# Patient Record
Sex: MALE | Race: WHITE | URBAN - METROPOLITAN AREA
[De-identification: names, ages, dates, MRNs, and addresses within clinical notes are randomized per-mention and may not be internally consistent; named-entity substitution may affect disease eponyms.]

---

## 2017-01-06 ENCOUNTER — OFFICE VISIT (OUTPATIENT)
Dept: FAMILY MEDICINE | Facility: CLINIC | Age: 62
End: 2017-01-06
Payer: COMMERCIAL

## 2017-01-06 VITALS
DIASTOLIC BLOOD PRESSURE: 74 MMHG | RESPIRATION RATE: 16 BRPM | SYSTOLIC BLOOD PRESSURE: 130 MMHG | HEIGHT: 69 IN | WEIGHT: 189 LBS | BODY MASS INDEX: 27.99 KG/M2 | HEART RATE: 68 BPM | TEMPERATURE: 98.1 F

## 2017-01-06 DIAGNOSIS — F41.1 GENERALIZED ANXIETY DISORDER: Primary | ICD-10-CM

## 2017-01-06 DIAGNOSIS — R45.851 SUICIDAL IDEATION: ICD-10-CM

## 2017-01-06 DIAGNOSIS — F32.0 MAJOR DEPRESSIVE DISORDER, SINGLE EPISODE, MILD (H): ICD-10-CM

## 2017-01-06 DIAGNOSIS — F51.04 PSYCHOPHYSIOLOGICAL INSOMNIA: ICD-10-CM

## 2017-01-06 DIAGNOSIS — J06.9 VIRAL URI WITH COUGH: ICD-10-CM

## 2017-01-06 DIAGNOSIS — E03.9 ACQUIRED HYPOTHYROIDISM: ICD-10-CM

## 2017-01-06 LAB — HGB BLD-MCNC: 14.7 G/DL (ref 13.3–17.7)

## 2017-01-06 PROCEDURE — 85018 HEMOGLOBIN: CPT | Performed by: PHYSICIAN ASSISTANT

## 2017-01-06 PROCEDURE — 36415 COLL VENOUS BLD VENIPUNCTURE: CPT | Performed by: PHYSICIAN ASSISTANT

## 2017-01-06 PROCEDURE — 99000 SPECIMEN HANDLING OFFICE-LAB: CPT | Performed by: PHYSICIAN ASSISTANT

## 2017-01-06 PROCEDURE — 84443 ASSAY THYROID STIM HORMONE: CPT | Performed by: PHYSICIAN ASSISTANT

## 2017-01-06 PROCEDURE — 82306 VITAMIN D 25 HYDROXY: CPT | Mod: 90 | Performed by: PHYSICIAN ASSISTANT

## 2017-01-06 PROCEDURE — 99215 OFFICE O/P EST HI 40 MIN: CPT | Performed by: PHYSICIAN ASSISTANT

## 2017-01-06 RX ORDER — ESCITALOPRAM OXALATE 20 MG/1
TABLET ORAL
Qty: 30 TABLET | Refills: 0 | Status: SHIPPED | OUTPATIENT
Start: 2017-01-06 | End: 2017-02-09

## 2017-01-06 RX ORDER — MULTIPLE VITAMINS W/ MINERALS TAB 9MG-400MCG
1 TAB ORAL DAILY
COMMUNITY

## 2017-01-06 NOTE — NURSING NOTE
"  SUBJECTIVE:                                                    Adeel Long is a 61 year old male who presents to clinic today for the following health issues:      Abnormal Mood Symptoms     Onset: ??? On and off episodes - episodes at work this week     Description:   Depression: YES- not sure  Anxiety: YES    Accompanying Signs & Symptoms:  Still participating in activities that you used to enjoy: YES  Fatigue: YES  Irritability: YES  Difficulty concentrating: YES-   Changes in appetite: no  Problems with sleep: YES- can't fall asleep  Heart racing/beating fast : YES- this week  Thoughts of hurting yourself or others: thoughts it's NOT worth it     History:   Recent stress: YES- work issues/stress  Prior depression hospitalization: None  Family history of depression: YES- sister   Family history of anxiety: no      Precipitating factors:   Alcohol/drug use: no    Alleviating factors:  none       Therapies Tried and outcome: None      {additional problems for provider to add:750836}    Problem list and histories reviewed & adjusted, as indicated.  Additional history: {NONE - AS DOCUMENTED:169738::\"as documented\"}    {HIST REVIEW/ LINKS 2:228855}    {PROVIDER CHARTING PREFERENCE:928910}      "

## 2017-01-06 NOTE — Clinical Note
[address] [place] [address]   [phone]      January 10, 2017    Adeel Long  [address] [facility]  [place] [address]                Suhas Denis,    I have reviewed your recent labs. Here are the results:    -Thyroid testing is OK - continue same dose.   -Hemoglobin is normal.   -Vitamin D is OK - take 2,000 IU vitamin D3 over the counter daily.     If you have any questions please do not hesitate to contact our office via phone ([phone]) or First Opinion by clicking the contact my Care Team link.    If you have further questions about the interpretation of your lab results, [email or web address] is a great website to check out.    Thank you for allowing me to participate in your care!    CALLY Cleveland, PAIgorC  [facility]

## 2017-01-06 NOTE — PATIENT INSTRUCTIONS
1. Melatonin nightly - 9 mg max.   2. Lexapro - new medication.   3. Schedule counseling.   4. Follow up in a couple of weeks for recheck.       [facility] Mobile Crisis Services    [place] (Adult Only):  [phone]    [place]:   [phone]    [place]:   [phone]    [place]: Child:  [phone]        Adult: [phone]    [place]: Child:  [phone]                  Adult:  [phone]    [place]:  [phone]      Crisis Connection:   [phone]  (24 hour Crisis Phone Line)    Acute Psychiatric  Phone: [phone]   Services  Suicide Hotline: 802.307.1444     [address], [place] [address]    (Walk-in and telephone crisis intervention services focusing on adults)    Crisis Intervention: [phone] or 322-920-2646 (TTY: [phone]). Call anytime for help.   [employer] (www.[place].zoraida.org): 827.189.7541 or 794-957-7840.      Treating Anxiety Disorders with Medication  An anxiety disorder can make you feel nervous or apprehensive, even without a clear reason. Certain anxiety disorders can cause intense feelings of fear or panic. You may even have physical symptoms, such as a racing heartbeat or dizziness. If you have these feelings, you don t have to suffer anymore. Treatment to help you overcome your fears will likely include therapy (also called counseling). Medication may also be prescribed to help control your symptoms.    Medications  Certain medications may be prescribed to help control your symptoms. As a result, you may feel less anxious. You may also feel able to move forward with therapy. At first, medications and dosages may need to be adjusted to find what works best for you. Try to be patient. Tell your health care provider how a medication makes you feel. This way, you can work together to find the treatment that s best for you. Keep in mind that medications can have side effects. Talk to your provider about any side effects that are bothering you.  Changing the dose or type of medication may help. Don t stop taking medication on your own because it can cause symptoms to come back.    Anti-anxiety medication: This medication eases symptoms and helps you relax. Your health care provider will explain when and how to use it. It may be prescribed for use before situations that makes you anxious. Or, you may be told to take it on a regular schedule. Anti-anxiety medication may make you feel a little sleepy or  out of it.  Don t drive a car or operate machinery while on this medication, until you know how it affects you.  Caution  Never use alcohol or other drugs with anti-anxiety medications. This could result in loss of muscular control, sedation, coma or death. Also, use only the amount of medication prescribed for you. If you think you may have taken too much, get emergency care right away.     Antidepressant medication: This kind of medication is often used to treat anxiety, even if you aren t depressed. An antidepressant helps balance out brain chemicals. This helps keep anxiety under control. This medication is taken on a schedule. It takes a few weeks to start working. If you don t notice a change at first, you may just need more time. But if you don t notice results after the first few weeks, tell your provider.  Keep taking medications as prescribed  Never change your dosage or stop taking your medications without talking to your health care provider first. Keep the following in mind:    Some medications must be taken on a schedule. Make this part of your daily routine. For instance, always take your pill before brushing your teeth. A pillbox can help you remember if you ve taken your medication each day.    Medications are often taken for 6 to 12 months. Your health care provider will then evaluate whether you need to stay on them. Many people who have also had therapy may no longer need medication to manage anxiety.    You may need to stop taking  medication slowly to give your body time to adjust. When it s time to stop, your health care provider will tell you more. Remember: Never stop taking your medication without talking to your provider first.    If symptoms return, you may need to start taking medications again. This isn t your fault. It s just the nature of your anxiety disorder.  Special concerns    Side effects: Medications may cause side effects. Ask your health care provider or pharmacist what you can expect. They may have ideas for avoiding some side effects.    Sexual problems: Some antidepressants can affect your desire for sex or your ability to have an orgasm. A change in dosage or medication often solves the problem. If you have a sexual side effect that concerns you, tell your health care provider.    Addiction: Antidepressants are not addictive. And if you ve never had a problem with drugs or alcohol, you likely won t have a problem with anti-anxiety medication. But if you have history of addiction, you may need to avoid this medication.     1526-4012 [employer]. [address] [place] [address], [place] [address]. All rights reserved. This information is not intended as a substitute for professional medical care. Always follow your healthcare professional's instructions.

## 2017-01-06 NOTE — PROGRESS NOTES
SUBJECTIVE:                                                    Adeel Long is a 61 year old male who presents to clinic today for the following health issues:      Abnormal Mood Symptoms     Onset: ??? On and off episodes - episodes at work this week     Description:   Depression: YES- not sure  Anxiety: YES    Accompanying Signs & Symptoms:  Still participating in activities that you used to enjoy: YES  Fatigue: YES  Irritability: YES  Difficulty concentrating: YES-   Changes in appetite: no  Problems with sleep: YES- can't fall asleep  Heart racing/beating fast : YES- this week  Thoughts of hurting yourself or others: thoughts it's NOT worth it     History:   Recent stress: YES- work issues/stress  Prior depression hospitalization: None  Family history of depression: YES- sister   Family history of anxiety: no      Precipitating factors:   Alcohol/drug use: no    Alleviating factors:  none       Therapies Tried and outcome: None    Patient has been feeling this way for about the past year. Struggles with work and assignments. Was working in [place], family here though. Overwhelmed and frustrated. Also frustrated with things at home - the clutter really bothers him.  He endorses fleeting suicidal thoughts because when he has been very overwhelmed, he wonders if it's worth it to live. He would never act on his thoughts and has never made a plan. He has good support from dtr and wife. Dtr taking zoloft and they had a heart-to-heart about this recently, which helped him come in to talk about it. Wife accompanies patient, but he asked her to step out.       #2- URI - few days, getting better. Runny nose, mild cough. No fevers or rash.     Unsure when last had his thyroid checked.   -------------------------------------    Problem list and histories reviewed & adjusted, as indicated.  Additional history: as documented    Patient Active Problem List   Diagnosis     Sun-damaged skin     Acquired hypothyroidism      Major depressive disorder, single episode, mild (H)     Generalized anxiety disorder     Suicidal ideation     Psychophysiological insomnia     Past Surgical History   Procedure Laterality Date     Manning teeth       Esophagus surgery       zenker's diverticulum tx x 2     Laparoscopic herniorrhaphy inguinal bilateral Right 6/5/2015     Procedure: LAPAROSCOPIC HERNIORRHAPHY INGUINAL BILATERAL;  Surgeon: Donnell Isaca MD;  Location: [facility]     Laparoscopic herniorrhaphy umbilical N/A 6/5/2015     Procedure: LAPAROSCOPIC HERNIORRHAPHY UMBILICAL;  Surgeon: Donnell Isaac MD;  Location: [facility]       Social History   Substance Use Topics     Smoking status: Light Tobacco Smoker     Types: Cigars     Smokeless tobacco: Not on file     Alcohol Use: 0.0 oz/week     0 Standard drinks or equivalent per week      Comment: 8 per week     Family History   Problem Relation Age of Onset     Colon Cancer Father      Hypertension Mother      Coronary Artery Disease Mother      stroke     Dementia Mother          Current Outpatient Prescriptions   Medication Sig Dispense Refill     multivitamin, therapeutic with minerals (MULTI-VITAMIN) TABS tablet Take 1 tablet by mouth daily       escitalopram (LEXAPRO) 20 MG tablet Take 1/2 tablet (10 mg) for 5 days, then increase to 1 tablet orally daily 30 tablet 0     levothyroxine (SYNTHROID,LEVOTHROID) 100 MCG tablet Take 100 mcg by mouth daily        No Known Allergies  Labs reviewed in EPIC  Problem list, Medication list, Allergies, and Medical/Social/Surgical histories reviewed in [facility] and updated as appropriate.    Social History     Social History     Marital Status:      Spouse Name:       Number of Children: 1     Years of Education: N/A     Occupational History     [employer]      Social History Main Topics     Smoking status: Light Tobacco Smoker     Types: Cigars     Smokeless tobacco: None     Alcohol Use: 0.0 oz/week     0 Standard drinks or equivalent  "per week      Comment: 8 per week     Drug Use: No     Sexual Activity: Yes     Other Topics Concern     None     Social History Narrative       10 point review of systems negative other than symptoms noted above.   Constitutional, HEENT, CV, pulmonary, GI, , MS, Endo, Psych systems are all negative, except as otherwise noted.       OBJECTIVE:  /74 mmHg  Pulse 68  Temp(Src) 98.1  F (36.7  C)  Resp 16  Ht 5' 9\" (1.753 m)  Wt 189 lb (85.73 kg)  BMI 27.90 kg/m2  CONSTITUTIONAL: Alert, well-nourished, well-groomed, NAD  RESP: Lungs CTA. No wheeze, rhonchi, rales. Normal effort on room air. Equal lung sounds bilaterally.   CV: HRRR, normal S1, S2. No MRG. No peripheral edema.  DERM: No rashes or suspicious lesions  PSYCH: Alert and oriented. Thought process is clear and goal-directed. Adequate insight and judgement. Mood - normal. Affect - normal. Full emotional range.   Head: Normocephalic, atraumatic.  Eyes: Conjunctiva clear, non icteric. PERRLA.  Ears: External ears and TMs normal BL.  Nose: Septum midline, nasal mucosa pink with clear drainage.   Mouth / Throat: Normal dentition.  No oral lesions. Pharynx non erythematous, tonsils without hypertrophy.  Neck: Supple, no enlarged LN, trachea midline.      Diagnostic Tests:  PHQ: 5-7  CHRISTINE: 7    ASSESSMENT/PLAN:  (F41.1) Generalized anxiety disorder  (primary encounter diagnosis)  Comment:   Plan: escitalopram (LEXAPRO) 20 MG tablet, MENTAL         HEALTH REFERRAL            (E03.9) Acquired hypothyroidism  Comment:   Plan: TSH with free T4 reflex            (F32.0) Major depressive disorder, single episode, mild (H)  Comment:   Plan: Vitamin D Deficiency, Hemoglobin, TSH with free        T4 reflex, escitalopram (LEXAPRO) 20 MG tablet,        MENTAL HEALTH REFERRAL            (J06.9,  B97.89) Viral URI with cough  Comment:   Plan: - Fluids, rest, good hand hygiene, over the counter analgesics (tylenol, ibuprofen) for fevers and/or pain, return if not " improving.      (F51.04) Psychophysiological insomnia  Comment:   Plan:     (Q45.498) Suicidal ideation  Comment:   Plan:     We discussed options of medication and / or counseling. He opts to do both. We will start him on Lexapro. He has been using melatonin for sleep, which helps, so we reviewed how to use this. We reviewed time expectation with Lexapro and possible side effects. He contracts for safety and we filled out a safety contract plan together - will scan into chart. He was also provided with crisis phone #s. I will see him back in 3-4 weeks for medication check, sooner PRN.     He does have a PCP at another clinic and is unsure if he will be transferring all care to here.       FOLLOW-UP: Routinely and sooner as needed.  The patient agrees with this assessment and plan and agrees to call or return to the clinic with any questions or concerns or if their condition worsens.  47 minutes. More than half of this time was spent in counseling and coordination of care      CALLY Witt, PA-C  [facility]

## 2017-01-06 NOTE — MR AVS SNAPSHOT
After Visit Summary   1/6/2017    Adeel Long    MRN: [id number]           Patient Information     Date Of Birth          1955        Visit Information        Provider Department      1/6/2017 2:20 PM Nat Kerns PA-C [facility]        Today's Diagnoses     Acquired hypothyroidism    -  1     Generalized anxiety disorder         Major depressive disorder, single episode, mild (H)           Care Instructions    1. Melatonin nightly - 9 mg max.   2. Lexapro - new medication.   3. Schedule counseling.   4. Follow up in a couple of weeks for recheck.       [facility] Mobile Crisis Services    Tuyet (Adult Only):  [phone]    Dasia:   [phone]    Augie:   [phone]    Jose Luis: Child:  [phone]        Adult: [phone]    Osvaldo: Child:  [phone]                  Adult:  [phone]    [place]:  [phone]      Crisis Connection:   [phone]  (24 hour Crisis Phone Line)    Acute Psychiatric  Phone: [phone]   Services  Suicide Hotline: 963.786.8004     [facility] [address], [place] [address]    (Walk-in and telephone crisis intervention services focusing on adults)    Crisis Intervention: 305.985.2300 or 912-834-8996 (TTY: 403.735.7140). Call anytime for help.   National [place] on Mental Illness (www.[place].zoraida.org): 767.777.2263 or 155-834-2800.      Treating Anxiety Disorders with Medication  An anxiety disorder can make you feel nervous or apprehensive, even without a clear reason. Certain anxiety disorders can cause intense feelings of fear or panic. You may even have physical symptoms, such as a racing heartbeat or dizziness. If you have these feelings, you don t have to suffer anymore. Treatment to help you overcome your fears will likely include therapy (also called counseling). Medication may also be prescribed to help control your symptoms.    Medications  Certain medications may be prescribed to help control your  symptoms. As a result, you may feel less anxious. You may also feel able to move forward with therapy. At first, medications and dosages may need to be adjusted to find what works best for you. Try to be patient. Tell your health care provider how a medication makes you feel. This way, you can work together to find the treatment that s best for you. Keep in mind that medications can have side effects. Talk to your provider about any side effects that are bothering you. Changing the dose or type of medication may help. Don t stop taking medication on your own because it can cause symptoms to come back.    Anti-anxiety medication: This medication eases symptoms and helps you relax. Your health care provider will explain when and how to use it. It may be prescribed for use before situations that makes you anxious. Or, you may be told to take it on a regular schedule. Anti-anxiety medication may make you feel a little sleepy or  out of it.  Don t drive a car or operate machinery while on this medication, until you know how it affects you.  Caution  Never use alcohol or other drugs with anti-anxiety medications. This could result in loss of muscular control, sedation, coma or death. Also, use only the amount of medication prescribed for you. If you think you may have taken too much, get emergency care right away.     Antidepressant medication: This kind of medication is often used to treat anxiety, even if you aren t depressed. An antidepressant helps balance out brain chemicals. This helps keep anxiety under control. This medication is taken on a schedule. It takes a few weeks to start working. If you don t notice a change at first, you may just need more time. But if you don t notice results after the first few weeks, tell your provider.  Keep taking medications as prescribed  Never change your dosage or stop taking your medications without talking to your health care provider first. Keep the following in mind:    Some  medications must be taken on a schedule. Make this part of your daily routine. For instance, always take your pill before brushing your teeth. A pillbox can help you remember if you ve taken your medication each day.    Medications are often taken for 6 to 12 months. Your health care provider will then evaluate whether you need to stay on them. Many people who have also had therapy may no longer need medication to manage anxiety.    You may need to stop taking medication slowly to give your body time to adjust. When it s time to stop, your health care provider will tell you more. Remember: Never stop taking your medication without talking to your provider first.    If symptoms return, you may need to start taking medications again. This isn t your fault. It s just the nature of your anxiety disorder.  Special concerns    Side effects: Medications may cause side effects. Ask your health care provider or pharmacist what you can expect. They may have ideas for avoiding some side effects.    Sexual problems: Some antidepressants can affect your desire for sex or your ability to have an orgasm. A change in dosage or medication often solves the problem. If you have a sexual side effect that concerns you, tell your health care provider.    Addiction: Antidepressants are not addictive. And if you ve never had a problem with drugs or alcohol, you likely won t have a problem with anti-anxiety medication. But if you have history of addiction, you may need to avoid this medication.     9662-6182 [employer] acquired by [employer]. [address] [place] [address]. All rights reserved. This information is not intended as a substitute for professional medical care. Always follow your healthcare professional's instructions.              Follow-ups after your visit        Additional Services     MENTAL HEALTH REFERRAL       Your provider has referred you to: [facility]: [facility] - Counseling (Individual/Couples/Family)  "- [facility] Igor Gonzalesen [place] [phone]   [email or web address]   *Patient will be contacted by [facility]'s scheduling partner, [facility] ([facility]), to schedule an appointment.  Patients may also call P to schedule.    All scheduling is subject to the client's specific insurance plan & benefits, provider/location availability, and provider clinical specialities.  Please arrive 15 minutes early for your first appointment and bring your completed paperwork.    Please be aware that coverage of these services is subject to the terms and limitations of your health insurance plan.  Call member services at your health plan with any benefit or coverage questions.                  Who to contact     If you have questions or need follow up information about today's clinic visit or your schedule please contact [facility] [place] directly at [phone].  Normal or non-critical lab and imaging results will be communicated to you by Prescienthart, letter or phone within 4 business days after the clinic has received the results. If you do not hear from us within 7 days, please contact the clinic through Prescienthart or phone. If you have a critical or abnormal lab result, we will notify you by phone as soon as possible.  Submit refill requests through Ingogo or call your pharmacy and they will forward the refill request to us. Please allow 3 business days for your refill to be completed.          Additional Information About Your Visit        Ingogo Information     Ingogo lets you send messages to your doctor, view your test results, renew your prescriptions, schedule appointments and more. To sign up, go to [email or web address] . Click on \"Log in\" on the left side of the screen, which will take you to the Welcome page. Then click on \"Sign up Now\" on the right side of the page.     You will be asked to enter the access code listed below, as well " "as some personal information. Please follow the directions to create your username and password.     Your access code is: [email or web address]  Expires: 2017  2:59 PM     Your access code will  in 90 days. If you need help or a new code, please call your [place] clinic or [phone].        Care EveryWhere ID     This is your Care EveryWhere ID. This could be used by other organizations to access your [place] medical records  [id number]        Your Vitals Were     Pulse Temperature Respirations Height BMI (Body Mass Index)       68 98.1  F (36.7  C) 16 5' 9\" (1.753 m) 27.90 kg/m2        Blood Pressure from Last 3 Encounters:   17 144/70   06/05/15 126/78    Weight from Last 3 Encounters:   17 189 lb (85.73 kg)   06/05/15 199 lb 6 oz (90.436 kg)              We Performed the Following     Hemoglobin     MENTAL HEALTH REFERRAL     TSH with free T4 reflex     Vitamin D Deficiency          Today's Medication Changes          These changes are accurate as of: 17  2:59 PM.  If you have any questions, ask your nurse or doctor.               Start taking these medicines.        Dose/Directions    escitalopram 20 MG tablet   Commonly known as:  LEXAPRO   Used for:  Generalized anxiety disorder, Major depressive disorder, single episode, mild (H)   Started by:  Nat Kerns PA-C        Take 1/2 tablet (10 mg) for 5 days, then increase to 1 tablet orally daily   Quantity:  30 tablet   Refills:  0            Where to get your medicines      These medications were sent to [id number] - [place], [place] - [address] AT [facility] OF [place] [address]  [address], [place] [address]    Hours:  24-hours Phone:  [phone]    - escitalopram 20 MG tablet             Primary Care Provider Office Phone # Fax #    Negro ARNOL [place] [phone]       [facility] [address]  [place] [address]        Thank you!     Thank you " for choosing [facility] [place]  for your care. Our goal is always to provide you with excellent care. Hearing back from our patients is one way we can continue to improve our services. Please take a few minutes to complete the written survey that you may receive in the mail after your visit with us. Thank you!             Your Updated Medication List - Protect others around you: Learn how to safely use, store and throw away your medicines at www.disposemymeds.org.          This list is accurate as of: 1/6/17  2:59 PM.  Always use your most recent med list.                   Brand Name Dispense Instructions for use    escitalopram 20 MG tablet    LEXAPRO    30 tablet    Take 1/2 tablet (10 mg) for 5 days, then increase to 1 tablet orally daily       levothyroxine 100 MCG tablet    SYNTHROID/LEVOTHROID     Take 100 mcg by mouth daily       Multi-vitamin Tabs tablet      Take 1 tablet by mouth daily

## 2017-01-07 LAB — TSH SERPL DL<=0.005 MIU/L-ACNC: 1.45 MU/L (ref 0.4–4)

## 2017-01-09 ASSESSMENT — ANXIETY QUESTIONNAIRES
IF YOU CHECKED OFF ANY PROBLEMS ON THIS QUESTIONNAIRE, HOW DIFFICULT HAVE THESE PROBLEMS MADE IT FOR YOU TO DO YOUR WORK, TAKE CARE OF THINGS AT HOME, OR GET ALONG WITH OTHER PEOPLE: SOMEWHAT DIFFICULT
7. FEELING AFRAID AS IF SOMETHING AWFUL MIGHT HAPPEN: SEVERAL DAYS
2. NOT BEING ABLE TO STOP OR CONTROL WORRYING: SEVERAL DAYS
5. BEING SO RESTLESS THAT IT IS HARD TO SIT STILL: SEVERAL DAYS
3. WORRYING TOO MUCH ABOUT DIFFERENT THINGS: SEVERAL DAYS
1. FEELING NERVOUS, ANXIOUS, OR ON EDGE: SEVERAL DAYS
GAD7 TOTAL SCORE: 7
6. BECOMING EASILY ANNOYED OR IRRITABLE: SEVERAL DAYS

## 2017-01-09 ASSESSMENT — PATIENT HEALTH QUESTIONNAIRE - PHQ9: 5. POOR APPETITE OR OVEREATING: SEVERAL DAYS

## 2017-01-10 LAB — DEPRECATED CALCIDIOL+CALCIFEROL SERPL-MC: 37 UG/L (ref 20–75)

## 2017-01-10 ASSESSMENT — ANXIETY QUESTIONNAIRES: GAD7 TOTAL SCORE: 7

## 2017-01-10 ASSESSMENT — PATIENT HEALTH QUESTIONNAIRE - PHQ9: SUM OF ALL RESPONSES TO PHQ QUESTIONS 1-9: 9

## 2017-01-13 ENCOUNTER — TELEPHONE (OUTPATIENT)
Dept: FAMILY MEDICINE | Facility: CLINIC | Age: 62
End: 2017-01-13

## 2017-01-13 NOTE — TELEPHONE ENCOUNTER
Patient states feeling good and helping the mood.  Could also be the sleep.  Denies any thought of self harm.  Was very upbeat on phone vocally and sounded happy.    Also gave lab test results will get OTC vitamin D3  Crys Sullivan RN  [facility] [employer]

## 2017-01-13 NOTE — TELEPHONE ENCOUNTER
Can you please call Adeel and see how he is feeling since starting the Lexapro? Any thoughts of harming himself?    Nat Kerns, CALLY, PA-C

## 2017-01-25 RX ORDER — ESCITALOPRAM OXALATE 20 MG/1
TABLET ORAL
Qty: 30 TABLET | Refills: 0 | OUTPATIENT
Start: 2017-01-25

## 2017-01-25 NOTE — TELEPHONE ENCOUNTER
lexapro     Last Written Prescription Date: 1/6/17  Last Fill Quantity: 30, # refills: 0  Last Office Visit with [facility] primary care provider:  1/6/17        Last PHQ-9 score on record=   PHQ-9 SCORE 1/9/2017   Total Score 9       1/6/17 OV note:    We discussed options of medication and / or counseling. He opts to do both. We will start him on Lexapro. He has been using melatonin for sleep, which helps, so we reviewed how to use this. We reviewed time expectation with Lexapro and possible side effects. He contracts for safety and we filled out a safety contract plan together - will scan into chart. He was also provided with crisis phone #s. I will see him back in 3-4 weeks for medication check, sooner PRN.       Patient has medication available, needs OV prior to refills.   Kate Arvizu RN   [facility] - Triage

## 2017-02-08 RX ORDER — ESCITALOPRAM OXALATE 20 MG/1
TABLET ORAL
Qty: 30 TABLET | Refills: 0 | Status: CANCELLED | OUTPATIENT
Start: 2017-02-08

## 2017-02-08 NOTE — TELEPHONE ENCOUNTER
Lexapro     Last Written Prescription Date: 1/6/17  Last Fill Quantity: 30, # refills: 0  Last Office Visit with [facility] primary care provider:  1/6/17   Next 5 appointments (look out 90 days)     Feb 09, 2017  7:00 AM   Office Visit with Nat Kerns PA-C   [facility] ([facility])    [address] [place] [id number] [phone]                   Last PHQ-9 score on record=   PHQ-9 SCORE 1/9/2017   Total Score 9           Lynsey Gabriel CMA

## 2017-02-09 ENCOUNTER — OFFICE VISIT (OUTPATIENT)
Dept: FAMILY MEDICINE | Facility: CLINIC | Age: 62
End: 2017-02-09
Payer: COMMERCIAL

## 2017-02-09 VITALS
WEIGHT: 189 LBS | RESPIRATION RATE: 16 BRPM | BODY MASS INDEX: 27.99 KG/M2 | DIASTOLIC BLOOD PRESSURE: 72 MMHG | SYSTOLIC BLOOD PRESSURE: 118 MMHG | OXYGEN SATURATION: 100 % | HEART RATE: 60 BPM | TEMPERATURE: 97.9 F | HEIGHT: 69 IN

## 2017-02-09 DIAGNOSIS — Z11.59 NEED FOR HEPATITIS C SCREENING TEST: ICD-10-CM

## 2017-02-09 DIAGNOSIS — F32.0 MAJOR DEPRESSIVE DISORDER, SINGLE EPISODE, MILD (H): ICD-10-CM

## 2017-02-09 DIAGNOSIS — Z12.11 SCREEN FOR COLON CANCER: ICD-10-CM

## 2017-02-09 DIAGNOSIS — Z23 NEED FOR PROPHYLACTIC VACCINATION AND INOCULATION AGAINST INFLUENZA: ICD-10-CM

## 2017-02-09 DIAGNOSIS — F41.1 GENERALIZED ANXIETY DISORDER: Primary | ICD-10-CM

## 2017-02-09 PROCEDURE — 90686 IIV4 VACC NO PRSV 0.5 ML IM: CPT | Performed by: PHYSICIAN ASSISTANT

## 2017-02-09 PROCEDURE — 90471 IMMUNIZATION ADMIN: CPT | Performed by: PHYSICIAN ASSISTANT

## 2017-02-09 PROCEDURE — 99213 OFFICE O/P EST LOW 20 MIN: CPT | Mod: 25 | Performed by: PHYSICIAN ASSISTANT

## 2017-02-09 RX ORDER — ESCITALOPRAM OXALATE 20 MG/1
TABLET ORAL
Qty: 90 TABLET | Refills: 0 | Status: SHIPPED | OUTPATIENT
Start: 2017-02-09 | End: 2017-05-02

## 2017-02-09 NOTE — TELEPHONE ENCOUNTER
Routing refill request to provider for review/approval because:  Appointment scheduled for 2/9/17- will hold prescription until then.    Pratibha Grey RN  [facility]  [phone]

## 2017-02-09 NOTE — MR AVS SNAPSHOT
After Visit Summary   2/9/2017    Adeel Long    MRN: [id number]           Patient Information     Date Of Birth          1955        Visit Information        Provider Department      2/9/2017 7:00 AM Nat Kerns PA-C [facility] [place]        Today's Diagnoses     Screen for colon cancer    -  1     Need for hepatitis C screening test         Need for prophylactic vaccination and inoculation against influenza         Generalized anxiety disorder         Major depressive disorder, single episode, mild (H)            Follow-ups after your visit        Additional Services     GASTROENTEROLOGY ADULT REF PROCEDURE ONLY       Last Lab Result: No results found for: CR  Body mass index is 27.9 kg/(m^2).     Needed:  No  Language:  English    Patient will be contacted to schedule procedure.     Please be aware that coverage of these services is subject to the terms and limitations of your health insurance plan.  Call member services at your health plan with any benefit or coverage questions.  Any procedures must be performed at a [facility] facility OR coordinated by your clinic's referral office.    Please bring the following with you to your appointment:    (1) Any X-Rays, CTs or MRIs which have been performed.  Contact the facility where they were done to arrange for  prior to your scheduled appointment.    (2) List of current medications   (3) This referral request   (4) Any documents/labs given to you for this referral                  Who to contact     If you have questions or need follow up information about today's clinic visit or your schedule please contact [facility] [place] directly at [phone].  Normal or non-critical lab and imaging results will be communicated to you by MyChart, letter or phone within 4 business days after the clinic has received the results. If you do not hear from us within 7 days, please contact the clinic  "through Youku or phone. If you have a critical or abnormal lab result, we will notify you by phone as soon as possible.  Submit refill requests through Youku or call your pharmacy and they will forward the refill request to us. Please allow 3 business days for your refill to be completed.          Additional Information About Your Visit        Youku Information     Youku lets you send messages to your doctor, view your test results, renew your prescriptions, schedule appointments and more. To sign up, go to [email or web address] . Click on \"Log in\" on the left side of the screen, which will take you to the Welcome page. Then click on \"Sign up Now\" on the right side of the page.     You will be asked to enter the access code listed below, as well as some personal information. Please follow the directions to create your username and password.     Your access code is: [email or web address]  Expires: 2017  2:59 PM     Your access code will  in 90 days. If you need help or a new code, please call your [place] clinic or [phone].        Care EveryWhere ID     This is your Care EveryWhere ID. This could be used by other organizations to access your [place] medical records  [id number]        Your Vitals Were     Pulse Temperature Respirations Height BMI (Body Mass Index) Pulse Oximetry    60 97.9  F (36.6  C) 16 5' 9\" (1.753 m) 27.90 kg/m2 100%       Blood Pressure from Last 3 Encounters:   17 118/72   17 132/74   06/05/15 126/78    Weight from Last 3 Encounters:   17 189 lb (85.73 kg)   17 189 lb (85.73 kg)   06/05/15 199 lb 6 oz (90.436 kg)              We Performed the Following     GASTROENTEROLOGY ADULT REF PROCEDURE ONLY          Today's Medication Changes          These changes are accurate as of: 17  7:36 AM.  If you have any questions, ask your nurse or doctor.               These medicines have changed or have updated prescriptions.        Dose/Directions    " escitalopram 20 MG tablet   Commonly known as:  LEXAPRO   This may have changed:  additional instructions   Used for:  Generalized anxiety disorder, Major depressive disorder, single episode, mild (H)   Changed by:  Nat Kerns PA-C        Take 1 tablet orally daily   Quantity:  90 tablet   Refills:  0            Where to get your medicines      These medications were sent to [facility] - [place] - [address] AT [facility] OF [place] & [facility] [address]  [address] [place] [address]    Hours:  24-hours Phone:  [phone]    - escitalopram 20 MG tablet             Primary Care Provider Office Phone # Fax #    Negro ARNOL AraujoShadia [phone]       [facility] [address]  [place] [address]        Thank you!     Thank you for choosing [facility] [place]  for your care. Our goal is always to provide you with excellent care. Hearing back from our patients is one way we can continue to improve our services. Please take a few minutes to complete the written survey that you may receive in the mail after your visit with us. Thank you!             Your Updated Medication List - Protect others around you: Learn how to safely use, store and throw away your medicines at www.disposemymeds.org.          This list is accurate as of: 2/9/17  7:36 AM.  Always use your most recent med list.                   Brand Name Dispense Instructions for use    escitalopram 20 MG tablet    LEXAPRO    90 tablet    Take 1 tablet orally daily       levothyroxine 100 MCG tablet    SYNTHROID/LEVOTHROID     Take 100 mcg by mouth daily       Multi-vitamin Tabs tablet      Take 1 tablet by mouth daily       VITAMIN D (CHOLECALCIFEROL) PO      Take 2,000 Units by mouth daily

## 2017-02-09 NOTE — NURSING NOTE
"Chief Complaint   Patient presents with     Recheck Medication       Initial /72 mmHg  Pulse 60  Temp(Src) 97.9  F (36.6  C)  Resp 16  Ht 5' 9\" (1.753 m)  Wt 189 lb (85.73 kg)  BMI 27.90 kg/m2  SpO2 100% Estimated body mass index is 27.9 kg/(m^2) as calculated from the following:    Height as of this encounter: 5' 9\" (1.753 m).    Weight as of this encounter: 189 lb (85.73 kg).  Medication Reconciliation: complete. CAMILA Garcia LPN      "

## 2017-02-09 NOTE — PROGRESS NOTES
SUBJECTIVE:                                                    Adeel Long is a 61 year old male who presents to clinic today for the following health issues:      Medication Followup of lexapro    Taking Medication as prescribed: yes    Side Effects:  Maybe energy level        Medication Helping Symptoms:  Yes, took last tab yesterday      Patient is here to f/u on CHRISTINE and MDD. He was started on Lexapro one month ago. He has not made a counseling apptmt d/t being too busy.  He was last seen here on 1/6/17 and decided to quit his job on 1/10 because he decided that was the root of most of his symptoms and he just couldn't do it anymore.   He has been without employment since that time. He has been looking for new jobs, but there aren't many in his field here (was working in [place]). He has an interview at [employer] this afternoon for an entry-level position. 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 He feels his symptoms are much better and before wanted to stop the medication, but now thinks he should continue. He also had his wisdom teeth out 2 weeks ago and was taking a lot of advil - thought the stomach upset was from lexapro, but now thinks it was not.   Otherwise, no side effects.   Patient denies any SI, HI, SIB, or hallucinations.    -------------------------------------    Problem list and histories reviewed & adjusted, as indicated.  Additional history: as documented    Patient Active Problem List   Diagnosis     Sun-damaged skin     Acquired hypothyroidism     Major depressive disorder, single episode, mild (H)     Generalized anxiety disorder     Suicidal ideation     Psychophysiological insomnia     Past Surgical History   Procedure Laterality Date     Lincoln teeth       Esophagus surgery       zenker's diverticulum tx x 2     Laparoscopic herniorrhaphy inguinal bilateral Right 6/5/2015     Procedure: LAPAROSCOPIC HERNIORRHAPHY INGUINAL BILATERAL;  Surgeon: Donnell Isaac  MD Howard;  Location: [facility]     Laparoscopic herniorrhaphy umbilical N/A 6/5/2015     Procedure: LAPAROSCOPIC HERNIORRHAPHY UMBILICAL;  Surgeon: Donnell Isaac MD;  Location: [facility]       Social History   Substance Use Topics     Smoking status: Light Tobacco Smoker     Types: Cigars     Smokeless tobacco: Not on file     Alcohol Use: 0.0 oz/week     0 Standard drinks or equivalent per week      Comment: 8 per week     Family History   Problem Relation Age of Onset     Colon Cancer Father      Hypertension Mother      Coronary Artery Disease Mother      stroke     Dementia Mother          Current Outpatient Prescriptions   Medication Sig Dispense Refill     VITAMIN D, CHOLECALCIFEROL, PO Take 2,000 Units by mouth daily       escitalopram (LEXAPRO) 20 MG tablet Take 1 tablet orally daily 90 tablet 0     multivitamin, therapeutic with minerals (MULTI-VITAMIN) TABS tablet Take 1 tablet by mouth daily       levothyroxine (SYNTHROID,LEVOTHROID) 100 MCG tablet Take 100 mcg by mouth daily        [DISCONTINUED] escitalopram (LEXAPRO) 20 MG tablet Take 1/2 tablet (10 mg) for 5 days, then increase to 1 tablet orally daily 30 tablet 0     No Known Allergies  Labs reviewed in EPIC  Problem list, Medication list, Allergies, and Medical/Social/Surgical histories reviewed in [facility] and updated as appropriate.    Social History     Social History     Marital Status:      Spouse Name:       Number of Children: 1     Years of Education: N/A     Occupational History           unemployed     Social History Main Topics     Smoking status: Light Tobacco Smoker     Types: Cigars     Smokeless tobacco: None     Alcohol Use: 0.0 oz/week     0 Standard drinks or equivalent per week      Comment: 8 per week     Drug Use: No     Sexual Activity: Yes     Other Topics Concern     None     Social History Narrative       10 point review of systems negative other than symptoms noted above.   Constitutional, HEENT, CV,  "pulmonary, GI, , MS, Endo, Psych systems are all negative, except as otherwise noted.       OBJECTIVE:  /72 mmHg  Pulse 60  Temp(Src) 97.9  F (36.6  C)  Resp 16  Ht 5' 9\" (1.753 m)  Wt 189 lb (85.73 kg)  BMI 27.90 kg/m2  SpO2 100%  CONSTITUTIONAL: Alert, well-nourished, well-groomed, NAD  RESP: Lungs CTA. No wheeze, rhonchi, rales. Normal effort on room air. Equal lung sounds bilaterally.   CV: HRRR, normal S1, S2. No MRG. No peripheral edema.  DERM: No rashes or suspicious lesions  PSYCH: Alert and oriented. Thought process is clear and goal-directed. Adequate insight and judgement. Mood - normal. Affect - normal.      Diagnostic Tests:  none    ASSESSMENT/PLAN:  (F41.1) Generalized anxiety disorder  (primary encounter diagnosis)  Comment: improved.   Plan: escitalopram (LEXAPRO) 20 MG tablet            (F32.0) Major depressive disorder, single episode, mild (H)  Comment: improved. Plan will be to continue Lexapro for 6-9 months minimum and then could consider tapering off.   Also encourage him to start counseling.   Plan: escitalopram (LEXAPRO) 20 MG tablet            (Z12.11) Screen for colon cancer  Comment: Father with colon cancer, but he thinks that may not be true? Regardless, has never had screening, so we discussed importance of this and he will set this up.   Plan: GASTROENTEROLOGY ADULT REF PROCEDURE ONLY            (Z11.59) Need for hepatitis C screening test  Comment:   Plan: declines    (Z23) Need for prophylactic vaccination and inoculation against influenza  Comment:   Plan: FLU VAC, SPLIT VIRUS IM > 3 YO (QUADRIVALENT)         [23228], Vaccine Administration, Initial         [92349]          He also mentions they are having trouble covering their mortgage payment now with him being unemployed. The [employer] company will let them postpone payment if he has a letter stating why. I told him I can write a letter stating he left his job due to stress and mental health reasons and is " actively seeking new employment. He will let me know if needs details for this. Advised would not be willing to provide disability, fmla, etc as I did not advise him to quit nor was this discussed.   F/u in 3months. Sooner if not going well. He will schedule counseling.     FOLLOW-UP: 3 months for medication check, and sooner as needed.  The patient agrees with this assessment and plan and agrees to call or return to the clinic with any questions or concerns or if their condition worsens.    CALLY Witt, PAIgorC  [facility]            Injectable Influenza Immunization Documentation    1.  Is the person to be vaccinated sick today?  No    2. Does the person to be vaccinated have an allergy to eggs or to a component of the vaccine?  No    3. Has the person to be vaccinated today ever had a serious reaction to influenza vaccine in the past?  No    4. Has the person to be vaccinated ever had Guillain-Elysburg syndrome?  No     Form completed by CAMILA Garcia LPN

## 2017-02-15 ENCOUNTER — TELEPHONE (OUTPATIENT)
Dept: FAMILY MEDICINE | Facility: CLINIC | Age: 62
End: 2017-02-15

## 2017-02-15 NOTE — TELEPHONE ENCOUNTER
Fell on Sunday night while playing hockey- hit head and left  Denies pain in shoulder and neck  Denies nausea/headaches/dizzy/slurred speech  No change mentation  Laceration on side of head above left ear  Advised that if he is having no symptoms he does not need an evaluation- but if any of the above symptoms start- he needs to call asap.  Patient understands and is agreeable.  Pratibha Grey RN  [facility]  [phone]

## 2017-02-15 NOTE — TELEPHONE ENCOUNTER
Name of caller: Adeel  Relationship to Patient: Self    Reason for Call: Patient stopped by the  - states he fell while playing hockey and has not had any unusual symptoms but his wife told him to get a nurses input. Please advise.    Best phone number to reach patient at is: [phone]  Ok to leave a message with medical info: Yes    Pharmacy preferred (if calling for a refill): NA

## 2017-04-29 DIAGNOSIS — F32.0 MAJOR DEPRESSIVE DISORDER, SINGLE EPISODE, MILD (H): ICD-10-CM

## 2017-04-29 DIAGNOSIS — F41.1 GENERALIZED ANXIETY DISORDER: ICD-10-CM

## 2017-05-01 RX ORDER — ESCITALOPRAM OXALATE 20 MG/1
TABLET ORAL
Qty: 90 TABLET | Refills: 0 | OUTPATIENT
Start: 2017-05-01

## 2017-05-01 NOTE — TELEPHONE ENCOUNTER
Due for f/u per last OV note on 2/9/17  appt scheduled for tomorrow    Next 5 appointments (look out 90 days)     May 02, 2017  1:00 PM CDT   Office Visit with Nat Kerns PA-C   [facility] ([facility]    [address] [place] [id number]   [phone]                Brenna Calderon RN

## 2017-05-01 NOTE — TELEPHONE ENCOUNTER
Called patient - left message to call clinic and ask for triage regarding a    Pratibha Grey RN  [facility]  [phone]

## 2017-05-01 NOTE — TELEPHONE ENCOUNTER
Patient Returning traige call. Could not get ahold of a nurse. Please call patient back. OK to leave a detailed message.    Jenny Xiong  Patient Rep

## 2017-05-02 ENCOUNTER — OFFICE VISIT (OUTPATIENT)
Dept: FAMILY MEDICINE | Facility: CLINIC | Age: 62
End: 2017-05-02
Payer: COMMERCIAL

## 2017-05-02 VITALS
WEIGHT: 187 LBS | HEIGHT: 69 IN | RESPIRATION RATE: 16 BRPM | SYSTOLIC BLOOD PRESSURE: 118 MMHG | OXYGEN SATURATION: 100 % | DIASTOLIC BLOOD PRESSURE: 64 MMHG | BODY MASS INDEX: 27.7 KG/M2 | HEART RATE: 60 BPM | TEMPERATURE: 97.8 F

## 2017-05-02 DIAGNOSIS — F41.1 GENERALIZED ANXIETY DISORDER: ICD-10-CM

## 2017-05-02 DIAGNOSIS — F32.0 MAJOR DEPRESSIVE DISORDER, SINGLE EPISODE, MILD (H): ICD-10-CM

## 2017-05-02 PROCEDURE — 99213 OFFICE O/P EST LOW 20 MIN: CPT | Performed by: PHYSICIAN ASSISTANT

## 2017-05-02 RX ORDER — ESCITALOPRAM OXALATE 20 MG/1
TABLET ORAL
Qty: 90 TABLET | Refills: 1 | Status: SHIPPED | OUTPATIENT
Start: 2017-05-02

## 2017-05-02 ASSESSMENT — ANXIETY QUESTIONNAIRES
1. FEELING NERVOUS, ANXIOUS, OR ON EDGE: NOT AT ALL
GAD7 TOTAL SCORE: 0
3. WORRYING TOO MUCH ABOUT DIFFERENT THINGS: NOT AT ALL
7. FEELING AFRAID AS IF SOMETHING AWFUL MIGHT HAPPEN: NOT AT ALL
IF YOU CHECKED OFF ANY PROBLEMS ON THIS QUESTIONNAIRE, HOW DIFFICULT HAVE THESE PROBLEMS MADE IT FOR YOU TO DO YOUR WORK, TAKE CARE OF THINGS AT HOME, OR GET ALONG WITH OTHER PEOPLE: NOT DIFFICULT AT ALL
5. BEING SO RESTLESS THAT IT IS HARD TO SIT STILL: NOT AT ALL
2. NOT BEING ABLE TO STOP OR CONTROL WORRYING: NOT AT ALL
6. BECOMING EASILY ANNOYED OR IRRITABLE: NOT AT ALL

## 2017-05-02 ASSESSMENT — PATIENT HEALTH QUESTIONNAIRE - PHQ9: 5. POOR APPETITE OR OVEREATING: NOT AT ALL
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
## 2017-05-02 NOTE — MR AVS SNAPSHOT
"              After Visit Summary   2017    Adeel Long    MRN: [id number]           Patient Information     Date Of Birth          1955        Visit Information        Provider Department      2017 1:00 PM Nat Kerns PA-C [facility]        Today's Diagnoses     Generalized anxiety disorder        Major depressive disorder, single episode, mild (H)           Follow-ups after your visit        Follow-up notes from your care team     Return in about 4 months (around 2017) for Medication Check.      Who to contact     If you have questions or need follow up information about today's clinic visit or your schedule please contact [facility] directly at [phone].  Normal or non-critical lab and imaging results will be communicated to you by Kirondohart, letter or phone within 4 business days after the clinic has received the results. If you do not hear from us within 7 days, please contact the clinic through Kirondohart or phone. If you have a critical or abnormal lab result, we will notify you by phone as soon as possible.  Submit refill requests through Procurify or call your pharmacy and they will forward the refill request to us. Please allow 3 business days for your refill to be completed.          Additional Information About Your Visit        MyChart Information     Procurify lets you send messages to your doctor, view your test results, renew your prescriptions, schedule appointments and more. To sign up, go to [email or web address] . Click on \"Log in\" on the left side of the screen, which will take you to the Welcome page. Then click on \"Sign up Now\" on the right side of the page.     You will be asked to enter the access code listed below, as well as some personal information. Please follow the directions to create your username and password.     Your access code is: [email or web address]  Expires: 2017  2:27 PM     Your access code will  in " "90 days. If you need help or a new code, please call your [place] clinic or [phone].        Care EveryWhere ID     This is your Care EveryWhere ID. This could be used by other organizations to access your [place] medical records  [id number]        Your Vitals Were     Pulse Temperature Respirations Height Pulse Oximetry BMI (Body Mass Index)    60 97.8  F (36.6  C) 16 5' 9\" (1.753 m) 100% 27.62 kg/m2       Blood Pressure from Last 3 Encounters:   05/02/17 118/64   02/09/17 118/72   01/06/17 132/74    Weight from Last 3 Encounters:   05/02/17 187 lb (84.8 kg)   02/09/17 189 lb (85.7 kg)   01/06/17 189 lb (85.7 kg)              Today, you had the following     No orders found for display         Where to get your medicines      These medications were sent to [id number] - [place], [place] - [address] AT [facility] [place] & [facility]  [address], [place] [address]    Hours:  24-hours Phone:  [phone]     escitalopram 20 MG tablet          Primary Care Provider Office Phone # Fax #    [place] [phone]       [facility] [address]  [place] [address]        Thank you!     Thank you for choosing [facility] [place]  for your care. Our goal is always to provide you with excellent care. Hearing back from our patients is one way we can continue to improve our services. Please take a few minutes to complete the written survey that you may receive in the mail after your visit with us. Thank you!             Your Updated Medication List - Protect others around you: Learn how to safely use, store and throw away your medicines at www.disposemymeds.org.          This list is accurate as of: 5/2/17  2:27 PM.  Always use your most recent med list.                   Brand Name Dispense Instructions for use    escitalopram 20 MG tablet    LEXAPRO    90 tablet    Take 1 tablet orally daily       levothyroxine 100 MCG " tablet    SYNTHROID/LEVOTHROID     Take 100 mcg by mouth daily       Multi-vitamin Tabs tablet      Take 1 tablet by mouth daily       VITAMIN D (CHOLECALCIFEROL) PO      Take 2,000 Units by mouth daily

## 2017-05-03 ASSESSMENT — ANXIETY QUESTIONNAIRES: GAD7 TOTAL SCORE: 0

## 2017-05-03 ASSESSMENT — PATIENT HEALTH QUESTIONNAIRE - PHQ9: SUM OF ALL RESPONSES TO PHQ QUESTIONS 1-9: 0
